# Patient Record
Sex: FEMALE | Race: BLACK OR AFRICAN AMERICAN | NOT HISPANIC OR LATINO | URBAN - METROPOLITAN AREA
[De-identification: names, ages, dates, MRNs, and addresses within clinical notes are randomized per-mention and may not be internally consistent; named-entity substitution may affect disease eponyms.]

---

## 2018-04-14 ENCOUNTER — INPATIENT (INPATIENT)
Facility: HOSPITAL | Age: 68
LOS: 1 days | Discharge: HOME | End: 2018-04-16
Attending: INTERNAL MEDICINE | Admitting: INTERNAL MEDICINE
Payer: MEDICARE

## 2018-04-14 VITALS
HEART RATE: 104 BPM | RESPIRATION RATE: 16 BRPM | TEMPERATURE: 98 F | DIASTOLIC BLOOD PRESSURE: 74 MMHG | SYSTOLIC BLOOD PRESSURE: 112 MMHG | OXYGEN SATURATION: 100 %

## 2018-04-14 LAB
ALBUMIN SERPL ELPH-MCNC: 4 G/DL — SIGNIFICANT CHANGE UP (ref 3.3–5)
ALP SERPL-CCNC: 83 U/L — SIGNIFICANT CHANGE UP (ref 40–120)
ALT FLD-CCNC: 19 U/L — SIGNIFICANT CHANGE UP (ref 4–33)
APTT BLD: 36.5 SEC — SIGNIFICANT CHANGE UP (ref 27.5–37.4)
AST SERPL-CCNC: 21 U/L — SIGNIFICANT CHANGE UP (ref 4–32)
BASOPHILS # BLD AUTO: 0.03 K/UL — SIGNIFICANT CHANGE UP (ref 0–0.2)
BASOPHILS NFR BLD AUTO: 0.4 % — SIGNIFICANT CHANGE UP (ref 0–2)
BASOPHILS NFR SPEC: 0 % — SIGNIFICANT CHANGE UP (ref 0–2)
BILIRUB SERPL-MCNC: 0.5 MG/DL — SIGNIFICANT CHANGE UP (ref 0.2–1.2)
BUN SERPL-MCNC: 23 MG/DL — SIGNIFICANT CHANGE UP (ref 7–23)
CALCIUM SERPL-MCNC: 9.5 MG/DL — SIGNIFICANT CHANGE UP (ref 8.4–10.5)
CHLORIDE SERPL-SCNC: 104 MMOL/L — SIGNIFICANT CHANGE UP (ref 98–107)
CK SERPL-CCNC: 99 U/L — SIGNIFICANT CHANGE UP (ref 25–170)
CO2 SERPL-SCNC: 26 MMOL/L — SIGNIFICANT CHANGE UP (ref 22–31)
CREAT SERPL-MCNC: 1.03 MG/DL — SIGNIFICANT CHANGE UP (ref 0.5–1.3)
DACRYOCYTES BLD QL SMEAR: SLIGHT — SIGNIFICANT CHANGE UP
EOSINOPHIL # BLD AUTO: 0.06 K/UL — SIGNIFICANT CHANGE UP (ref 0–0.5)
EOSINOPHIL NFR BLD AUTO: 0.9 % — SIGNIFICANT CHANGE UP (ref 0–6)
EOSINOPHIL NFR FLD: 2.7 % — SIGNIFICANT CHANGE UP (ref 0–6)
GIANT PLATELETS BLD QL SMEAR: PRESENT — SIGNIFICANT CHANGE UP
GLUCOSE SERPL-MCNC: 123 MG/DL — HIGH (ref 70–99)
HBA1C BLD-MCNC: 4.7 % — SIGNIFICANT CHANGE UP (ref 4–5.6)
HCT VFR BLD CALC: 35.8 % — SIGNIFICANT CHANGE UP (ref 34.5–45)
HGB BLD-MCNC: 12.1 G/DL — SIGNIFICANT CHANGE UP (ref 11.5–15.5)
HYPOCHROMIA BLD QL: SLIGHT — SIGNIFICANT CHANGE UP
IMM GRANULOCYTES # BLD AUTO: 0.02 # — SIGNIFICANT CHANGE UP
IMM GRANULOCYTES NFR BLD AUTO: 0.3 % — SIGNIFICANT CHANGE UP (ref 0–1.5)
INR BLD: 1.1 — SIGNIFICANT CHANGE UP (ref 0.88–1.17)
LYMPHOCYTES # BLD AUTO: 1.36 K/UL — SIGNIFICANT CHANGE UP (ref 1–3.3)
LYMPHOCYTES # BLD AUTO: 19.9 % — SIGNIFICANT CHANGE UP (ref 13–44)
LYMPHOCYTES NFR SPEC AUTO: 16.5 % — SIGNIFICANT CHANGE UP (ref 13–44)
MCHC RBC-ENTMCNC: 24.1 PG — LOW (ref 27–34)
MCHC RBC-ENTMCNC: 33.8 % — SIGNIFICANT CHANGE UP (ref 32–36)
MCV RBC AUTO: 71.2 FL — LOW (ref 80–100)
MICROCYTES BLD QL: SLIGHT — SIGNIFICANT CHANGE UP
MONOCYTES # BLD AUTO: 0.36 K/UL — SIGNIFICANT CHANGE UP (ref 0–0.9)
MONOCYTES NFR BLD AUTO: 5.3 % — SIGNIFICANT CHANGE UP (ref 2–14)
MONOCYTES NFR BLD: 3.7 % — SIGNIFICANT CHANGE UP (ref 2–9)
NEUTROPHIL AB SER-ACNC: 67 % — SIGNIFICANT CHANGE UP (ref 43–77)
NEUTROPHILS # BLD AUTO: 4.99 K/UL — SIGNIFICANT CHANGE UP (ref 1.8–7.4)
NEUTROPHILS NFR BLD AUTO: 73.2 % — SIGNIFICANT CHANGE UP (ref 43–77)
NRBC # FLD: 0 — SIGNIFICANT CHANGE UP
PLATELET # BLD AUTO: 334 K/UL — SIGNIFICANT CHANGE UP (ref 150–400)
PLATELET COUNT - ESTIMATE: NORMAL — SIGNIFICANT CHANGE UP
PMV BLD: 9.2 FL — SIGNIFICANT CHANGE UP (ref 7–13)
POLYCHROMASIA BLD QL SMEAR: SIGNIFICANT CHANGE UP
POTASSIUM SERPL-MCNC: 3.8 MMOL/L — SIGNIFICANT CHANGE UP (ref 3.5–5.3)
POTASSIUM SERPL-SCNC: 3.8 MMOL/L — SIGNIFICANT CHANGE UP (ref 3.5–5.3)
PROT SERPL-MCNC: 7.4 G/DL — SIGNIFICANT CHANGE UP (ref 6–8.3)
PROTHROM AB SERPL-ACNC: 12.2 SEC — SIGNIFICANT CHANGE UP (ref 9.8–13.1)
RBC # BLD: 5.03 M/UL — SIGNIFICANT CHANGE UP (ref 3.8–5.2)
RBC # FLD: 16.3 % — HIGH (ref 10.3–14.5)
REVIEW TO FOLLOW: YES — SIGNIFICANT CHANGE UP
SMUDGE CELLS # BLD: PRESENT — SIGNIFICANT CHANGE UP
SODIUM SERPL-SCNC: 143 MMOL/L — SIGNIFICANT CHANGE UP (ref 135–145)
TARGETS BLD QL SMEAR: SIGNIFICANT CHANGE UP
TROPONIN T SERPL-MCNC: < 0.06 NG/ML — SIGNIFICANT CHANGE UP (ref 0–0.06)
TROPONIN T SERPL-MCNC: < 0.06 NG/ML — SIGNIFICANT CHANGE UP (ref 0–0.06)
VARIANT LYMPHS # BLD: 10.1 % — SIGNIFICANT CHANGE UP
WBC # BLD: 6.82 K/UL — SIGNIFICANT CHANGE UP (ref 3.8–10.5)
WBC # FLD AUTO: 6.82 K/UL — SIGNIFICANT CHANGE UP (ref 3.8–10.5)

## 2018-04-14 PROCEDURE — 71046 X-RAY EXAM CHEST 2 VIEWS: CPT | Mod: 26

## 2018-04-14 RX ORDER — SODIUM CHLORIDE 9 MG/ML
1000 INJECTION INTRAMUSCULAR; INTRAVENOUS; SUBCUTANEOUS ONCE
Qty: 0 | Refills: 0 | Status: COMPLETED | OUTPATIENT
Start: 2018-04-14 | End: 2018-04-14

## 2018-04-14 RX ADMIN — SODIUM CHLORIDE 1000 MILLILITER(S): 9 INJECTION INTRAMUSCULAR; INTRAVENOUS; SUBCUTANEOUS at 16:48

## 2018-04-14 NOTE — ED CDU PROVIDER INITIAL DAY NOTE - ATTENDING CONTRIBUTION TO CARE
I Farhan Yu MD have personally performed a face to face diagnostic evaluation on this patient.  I have reviewed the ACP note and agree with the history, exam, and plan of care, except as noted.   My medical decision making and observations are found above.    Lungs clear, =strength, nl pronator (no drift.)

## 2018-04-14 NOTE — ED CDU PROVIDER INITIAL DAY NOTE - OBJECTIVE STATEMENT
Patient is a 67 year old female who states that this am she was sitting outside with her grand daughter when she became dizzy and lightheaded. States she was seated and that time and slumped over in the chair. Patient states she noticed her words starting to slur and become slower.  She states that it lasted approximately 5-10 minutes. EMS was called and they brought her to the ER.

## 2018-04-14 NOTE — ED CDU PROVIDER INITIAL DAY NOTE - MEDICAL DECISION MAKING DETAILS
patient is a 67 female with syncopal episode. PLan for cardiac enzymes, telemetry monitor, echocardiogram patient is a 67 female with syncopal episode. PLan for cardiac enzymes, telemetry monitor, echocardiogram  Gigi: episode of decreased responsiveness with slurred speech for less than a minute. then she was awake and nl. Never had sz activity, never had focal weakness. No complaints now.  Diabetic but not on hypoglycemics. will monitor rhythm in cdu, enzymes and ekg. Will get echo. Not felt to be stroke.

## 2018-04-14 NOTE — ED ADULT TRIAGE NOTE - CHIEF COMPLAINT QUOTE
Pt was slouched over in chair and family called 911.  Pt states she felt dizzy after sitting in sun.  Denies CP/SOB.  No LOC.  Daughter states pt slurred her speech during incident.  Denies ACE inhibitor allergy.  Speech clear, equal strength, no drift, no facial droop.  Dr. Yu notified to evaluate for stroke.  No code stroke called.  Incident occurred 1h 15 min ago.

## 2018-04-14 NOTE — ED PROVIDER NOTE - MEDICAL DECISION MAKING DETAILS
syncope; plan tele monitoring, ekg, labs, cxr, admit syncope; plan tele monitoring, ekg, labs, cxr, admit vs CDU for serial troponin/echo.

## 2018-04-14 NOTE — ED PROVIDER NOTE - PSH
S/P arthroscopy of left knee    S/P arthroscopy of right knee  x2  S/P biopsy  right breast  S/P colonoscopy  with polyp removal

## 2018-04-14 NOTE — ED PROVIDER NOTE - PMH
Diabetes mellitus  diet controlled  Diverticulosis    Gastritis    Hiatus hernia syndrome    HTN (hypertension)    Osteoarthritis  b/l knee  Restless leg

## 2018-04-14 NOTE — ED PROVIDER NOTE - ATTENDING CONTRIBUTION TO CARE
Attending Statement: I have personally seen and examined this patient. I have fully participated in the care of this patient. I have reviewed all pertinent clinical information, including history physical exam, plan and the Resident's note and agree except as noted  66yo F HX of HTN, DM from home sp syncopal episode. pt was outside, started to feel nausea and "not well" sat down, daughter found pt "slumped over" min responsive. Lasted for a few min. pt did not fall, no shaking activity. no urine or bowel incontinence. no vomit. pt wasn't responding to family. now feels better. Pt denies any cp/sob/palpitations. no focal weakness or numbness. no headache. no slurred speech. no change in vision. no prior cardiac workup. not on AC>  Vital signs noted. pt sitting up, nontoxic appearing. EOMI> no slurred speech. no fa Attending Statement: I have personally seen and examined this patient. I have fully participated in the care of this patient. I have reviewed all pertinent clinical information, including history physical exam, plan and the Resident's note and agree except as noted  68yo F HX of HTN, DM from home sp syncopal episode. pt was outside, started to feel nausea and "not well" sat down, daughter found pt "slumped over" min responsive. Lasted for a few min. pt did not fall, no shaking activity. no urine or bowel incontinence. no vomit. pt wasn't responding to family. now feels better. Pt denies any cp/sob/palpitations. no focal weakness or numbness. no headache. no slurred speech. no change in vision. no prior cardiac workup. not on AC>  Vital signs noted. pt sitting up, nontoxic appearing. EOMI> no slurred speech. no facial droop. no slurred speech. no drooling. nl sensation bl face/arms/Legs. normal S1-S2 No resp distress. able to speak in full and clear sentences. no wheeze, rales or stridor. AOx3, no focal deficits. CN 2-12 grossly intact. no meningeal signs. nl finger to nose.   plan ekg, labs, tele monitoring, admit for syncope.

## 2018-04-14 NOTE — ED PROVIDER NOTE - OBJECTIVE STATEMENT
67F h/o HTN and DM p/w near syncopal episode. Family found pt slumped over in a chair, eyes barely open, mumbling. Episode lasted a few minutes with rapid return to baseline mental status. Pt reports lightheadedness and nausea. No chest pain, SOB, diaphoresis, fall, LOC, or vomiting. No bowel/bladder incontinence or convulsions. No focal weakness/numbness. 67F h/o HTN and DM p/w near syncopal episode. Family found pt slumped over in a chair, eyes barely open, mumbling. Episode lasted a few minutes with rapid return to baseline mental status. Pt reports lightheadedness and nausea. No chest pain, SOB, diaphoresis, fall, LOC, or vomiting. No bowel/bladder incontinence or convulsions. No focal weakness/numbness. No recent illness.

## 2018-04-14 NOTE — ED CDU PROVIDER INITIAL DAY NOTE - ENMT NEGATIVE STATEMENT, MLM
Ears: no ear pain and no hearing problems. Nose: no nasal congestion and no nasal drainage .Mouth/Throat: no dysphagia, no hoarseness and no throat pain.Neck: no lumps, no pain, no stiffness and no swollen glands

## 2018-04-15 DIAGNOSIS — R55 SYNCOPE AND COLLAPSE: ICD-10-CM

## 2018-04-15 DIAGNOSIS — I10 ESSENTIAL (PRIMARY) HYPERTENSION: ICD-10-CM

## 2018-04-15 DIAGNOSIS — E11.9 TYPE 2 DIABETES MELLITUS WITHOUT COMPLICATIONS: ICD-10-CM

## 2018-04-15 DIAGNOSIS — Z29.9 ENCOUNTER FOR PROPHYLACTIC MEASURES, UNSPECIFIED: ICD-10-CM

## 2018-04-15 PROCEDURE — 99223 1ST HOSP IP/OBS HIGH 75: CPT

## 2018-04-15 PROCEDURE — 93306 TTE W/DOPPLER COMPLETE: CPT | Mod: 26

## 2018-04-15 RX ORDER — DEXTROSE 50 % IN WATER 50 %
1 SYRINGE (ML) INTRAVENOUS ONCE
Qty: 0 | Refills: 0 | Status: DISCONTINUED | OUTPATIENT
Start: 2018-04-15 | End: 2018-04-16

## 2018-04-15 RX ORDER — LOSARTAN POTASSIUM 100 MG/1
50 TABLET, FILM COATED ORAL DAILY
Qty: 0 | Refills: 0 | Status: DISCONTINUED | OUTPATIENT
Start: 2018-04-15 | End: 2018-04-16

## 2018-04-15 RX ORDER — LOSARTAN POTASSIUM 100 MG/1
0 TABLET, FILM COATED ORAL
Qty: 0 | Refills: 0 | COMMUNITY

## 2018-04-15 RX ORDER — LOSARTAN POTASSIUM 100 MG/1
1 TABLET, FILM COATED ORAL
Qty: 0 | Refills: 0 | COMMUNITY

## 2018-04-15 RX ORDER — HYDROCHLOROTHIAZIDE 25 MG
12.5 TABLET ORAL DAILY
Qty: 0 | Refills: 0 | Status: DISCONTINUED | OUTPATIENT
Start: 2018-04-15 | End: 2018-04-16

## 2018-04-15 RX ORDER — GLUCAGON INJECTION, SOLUTION 0.5 MG/.1ML
1 INJECTION, SOLUTION SUBCUTANEOUS ONCE
Qty: 0 | Refills: 0 | Status: DISCONTINUED | OUTPATIENT
Start: 2018-04-15 | End: 2018-04-16

## 2018-04-15 RX ORDER — LANOLIN ALCOHOL/MO/W.PET/CERES
3 CREAM (GRAM) TOPICAL AT BEDTIME
Qty: 0 | Refills: 0 | Status: DISCONTINUED | OUTPATIENT
Start: 2018-04-15 | End: 2018-04-16

## 2018-04-15 RX ORDER — ENOXAPARIN SODIUM 100 MG/ML
40 INJECTION SUBCUTANEOUS EVERY 24 HOURS
Qty: 0 | Refills: 0 | Status: DISCONTINUED | OUTPATIENT
Start: 2018-04-15 | End: 2018-04-16

## 2018-04-15 RX ORDER — DEXTROSE 50 % IN WATER 50 %
25 SYRINGE (ML) INTRAVENOUS ONCE
Qty: 0 | Refills: 0 | Status: DISCONTINUED | OUTPATIENT
Start: 2018-04-15 | End: 2018-04-16

## 2018-04-15 RX ORDER — INSULIN LISPRO 100/ML
VIAL (ML) SUBCUTANEOUS AT BEDTIME
Qty: 0 | Refills: 0 | Status: DISCONTINUED | OUTPATIENT
Start: 2018-04-15 | End: 2018-04-16

## 2018-04-15 RX ORDER — SODIUM CHLORIDE 9 MG/ML
1000 INJECTION, SOLUTION INTRAVENOUS
Qty: 0 | Refills: 0 | Status: DISCONTINUED | OUTPATIENT
Start: 2018-04-15 | End: 2018-04-16

## 2018-04-15 RX ORDER — INSULIN LISPRO 100/ML
VIAL (ML) SUBCUTANEOUS
Qty: 0 | Refills: 0 | Status: DISCONTINUED | OUTPATIENT
Start: 2018-04-15 | End: 2018-04-16

## 2018-04-15 RX ORDER — DEXTROSE 50 % IN WATER 50 %
12.5 SYRINGE (ML) INTRAVENOUS ONCE
Qty: 0 | Refills: 0 | Status: DISCONTINUED | OUTPATIENT
Start: 2018-04-15 | End: 2018-04-16

## 2018-04-15 RX ORDER — SODIUM CHLORIDE 9 MG/ML
1000 INJECTION INTRAMUSCULAR; INTRAVENOUS; SUBCUTANEOUS ONCE
Qty: 0 | Refills: 0 | Status: COMPLETED | OUTPATIENT
Start: 2018-04-15 | End: 2018-04-15

## 2018-04-15 RX ORDER — ACETAMINOPHEN 500 MG
650 TABLET ORAL EVERY 6 HOURS
Qty: 0 | Refills: 0 | Status: DISCONTINUED | OUTPATIENT
Start: 2018-04-15 | End: 2018-04-16

## 2018-04-15 RX ORDER — POLYETHYLENE GLYCOL 3350 17 G/17G
17 POWDER, FOR SOLUTION ORAL
Qty: 0 | Refills: 0 | Status: DISCONTINUED | OUTPATIENT
Start: 2018-04-15 | End: 2018-04-16

## 2018-04-15 RX ADMIN — LOSARTAN POTASSIUM 50 MILLIGRAM(S): 100 TABLET, FILM COATED ORAL at 22:47

## 2018-04-15 RX ADMIN — Medication 3 MILLIGRAM(S): at 22:48

## 2018-04-15 RX ADMIN — POLYETHYLENE GLYCOL 3350 17 GRAM(S): 17 POWDER, FOR SOLUTION ORAL at 22:47

## 2018-04-15 RX ADMIN — Medication 12.5 MILLIGRAM(S): at 22:48

## 2018-04-15 RX ADMIN — ENOXAPARIN SODIUM 40 MILLIGRAM(S): 100 INJECTION SUBCUTANEOUS at 22:47

## 2018-04-15 RX ADMIN — SODIUM CHLORIDE 500 MILLILITER(S): 9 INJECTION INTRAMUSCULAR; INTRAVENOUS; SUBCUTANEOUS at 20:30

## 2018-04-15 NOTE — ED CDU PROVIDER SUBSEQUENT DAY NOTE - HISTORY
Pt is a 68 y/o F p/w syncope yesterday.  Pt states yesterday, while sitting outside, she felt lightheaded then passed out.  Episode lasted for ~ 5 minutes.  Afterwards, pt as asymptomatic.  No associated chest pain, SOB.  Pt sent to CDU for tele monitoring and echo.  Presently pt offers no acute complaints.

## 2018-04-15 NOTE — H&P ADULT - HISTORY OF PRESENT ILLNESS
66 y/o Female with PMHx of HTN, DM, and asthma presents with syncope. Patient states she finished eating breakfast when she then started to experience dizziness and nausea. She went outside yesterday with family was able to go sit down on a chair and then passed out. She states her daughter said she was slumped over on the chair and not responding, unknown LOC. Within a few minutes she was back to baseline. Daughter called 911, in ambulance patient said she had elevated HR and low BP. She denies any prodromal chest pain, SOB, or palpitations. She has history of hypertension on Losartan/Hctz every other day- last taken on Friday evening. She has a history of hyperlipidemia, in February cholesterol levels normalized, simvastatin was discontinued per PCP.  Patient admitted to CDU where her vital signs remained stable and cardiac enzymes negative x 2. Echocardiogram performed showed  EF 67% Normal mitral valve. Chordal systolic anterior motion. Asymmetric septal hypertrophy. Normal left ventricular systolic function. Complete pulsed doppler of the LVOT not performed.  Turbulent flow of the LVOT  noted with color Doppler.  Unable to assess LVOT gradient. Suggest repeat limited doppler study from the LV cavity through the LVOT (with Valsalva) to assess for LVOT gradient.*** No previous echo exam. She was seen by House cardiology who recommended limited echocardiogram and telemetry monitoring. Patient denies any prior syncopal episodes. 68 y/o Female with PMHx of HTN, DM, and asthma presents with syncope. Patient states she finished eating breakfast when she then started to experience dizziness and nausea. She went outside yesterday with family was able to go sit down on a chair and then passed out. She states her daughter said she was slumped over on the chair and not responding. Within a few minutes she was back to baseline. Daughter called 911, in ambulance patient said she had elevated HR and low BP. She denies any prodromal chest pain, SOB, or palpitations. She has history of hypertension on Losartan/Hctz every other day- last taken on Friday evening. She has a history of hyperlipidemia, in February cholesterol levels normalized, simvastatin was discontinued per PCP.  Patient admitted to CDU where her vital signs remained stable and cardiac enzymes negative x 2. Echocardiogram performed showed  EF 67% Normal mitral valve. Chordal systolic anterior motion. Asymmetric septal hypertrophy. Normal left ventricular systolic function. Complete pulsed doppler of the LVOT not performed.  Turbulent flow of the LVOT  noted with color Doppler.  Unable to assess LVOT gradient. Suggest repeat limited doppler study from the LV cavity through the LVOT (with Valsalva) to assess for LVOT gradient.*** No previous echo exam. She was seen by House cardiology who recommended limited echocardiogram and telemetry monitoring. Patient denies any prior syncopal episodes. Currently asymptomatic, resting comfortably in bed.

## 2018-04-15 NOTE — ED CDU PROVIDER SUBSEQUENT DAY NOTE - PROGRESS NOTE DETAILS
Spoke with cardiology who will see pt in regards to abnormal echo. Spoke with cardiology who would like pt admitted to have repeat echo performed in the morning. Spoke with hospitalist Dr. Hunter who accepts this pt to her service. Pts PMD Dr.Mekala Crenshaw is not affiliated Spoke with cardiology who would like pt admitted to have repeat echo performed in the morning. Spoke with hospitalist Dr. Hunter who accepts this pt to her service. Pts PMD Dr.Mekala Crenshaw is not affiliated    CDU Att PN: Dax  Pt with recent syncope, now with abnrmal echo.  Is feeling well and has been asymptomatic, but will admit to cards for further eval.  I have personally performed a face to face evaluation of this patient including review of the history and focused physical exam.  I have discussed the case and reviewed the plan of care with the PA.

## 2018-04-15 NOTE — H&P ADULT - NSHPREVIEWOFSYSTEMS_GEN_ALL_CORE
Constitutional: No fever; no chills; fatigue or weight loss/gain; no diaphoresis  Skin: No rash, itching, ulcerations, dryness, pressure ulcer.  Eyes: no diplopia; no photophobia; no blurred vision   ENT: no hearing difficulty; no ear pain; no tinnitus; no vertigo; no nasal congestion; no nasal discharge; no nose bleeds no sore throat.  Endocrine: No thyroid problems.  Cardiovascular: No chest pain; no palpitations; no dyspnea on exertion; no PND; no orthopnea;   Respiratory: no shortness of breath; no wheezing; no dyspnea; no cough.  Gastrointestinal: No abdominal pain; +nausea; no vomiting; or diarrhea.  Genitourinary: No dysuria.  Musculoskeletal: no arthralgia; no arthritis; no joint swelling; no muscle weakness  Neurologic: no transient paralysis; no weakness; no paresthesias; no difficulty walking; no headache; +syncope

## 2018-04-15 NOTE — CONSULT NOTE ADULT - SUBJECTIVE AND OBJECTIVE BOX
Date of Admission: 4/15/2018    CHIEF COMPLAINT: syncope    HISTORY OF PRESENT ILLNESS: 67F with HTN, DM, and asthma presents with syncope. Patient was outside yesterday with family, had her breakfast and a while later felt dizzy, was able to go sit down and then passed out. Other than mild dizziness she denies any other prodrome, chest pain, SOB, or palpitations. Within a few minutes she felt better. In ER/CDU VSS and cardiac enzymes negative however TTE showed chordal NICOLE with nl MV however LVOT was difficult to evaluate.       Allergies    ACE inhibitors (Other)    Intolerances    	    MEDICATIONS:        melatonin 3 milliGRAM(s) Oral at bedtime        PAST MEDICAL & SURGICAL HISTORY:  HTN (hypertension)  Restless leg  Osteoarthritis: b/l knee  Hiatus hernia syndrome  Gastritis  Diverticulosis  Diabetes mellitus: diet controlled  S/P biopsy: right breast  S/P colonoscopy: with polyp removal  S/P arthroscopy of right knee: x2  S/P arthroscopy of left knee      FAMILY HISTORY:  No pertinent family history in first degree relatives      SOCIAL HISTORY:    Smoker  denies  Alcohol  denies  Drugs  denies      REVIEW OF SYSTEMS:  See HPI. Otherwise, 10 point ROS done and otherwise negative.    PHYSICAL EXAM:  T(C): 36.8 (04-15-18 @ 14:26), Max: 36.8 (04-14-18 @ 18:38)  HR: 73 (04-15-18 @ 14:26) (63 - 99)  BP: 137/76 (04-15-18 @ 14:26) (117/76 - 163/89)  RR: 16 (04-15-18 @ 14:26) (16 - 18)  SpO2: 99% (04-15-18 @ 14:26) (99% - 100%)  Wt(kg): --  I&O's Summary      Appearance: Normal	  HEENT:   Normal oral mucosa, PERRL, EOMI	  Cardiovascular: nl S1S2, no M/R/C, no change in heart sounds with valsalva manuever  Respiratory: CTA B/L  Psychiatry: A & O x 3, Mood & affect appropriate  Gastrointestinal:  Soft, Non-tender, + BS	  Skin: No rashes, No ecchymoses, No cyanosis	  Neurologic: Non-focal  Extremities: Normal range of motion, No clubbing, cyanosis or edema          LABS:	 	                        12.1   6.82  )-----------( 334      ( 14 Apr 2018 14:20 )             35.8       04-14    143  |  104  |  23  ----------------------------<  123<H>  3.8   |  26  |  1.03    Ca    9.5      14 Apr 2018 14:20    TPro  7.4  /  Alb  4.0  /  TBili  0.5  /  DBili  x   /  AST  21  /  ALT  19  /  AlkPhos  83  04-14        HgA1c: Hemoglobin A1C, Whole Blood: 4.7 % (04-14 @ 21:15)        CARDIAC MARKERS:  Troponin T, Serum: < 0.06 ng/mL (04-14-18 @ 21:15)  Troponin T, Serum: < 0.06 ng/mL (04-14-18 @ 14:20)        TELEMETRY: NSR  	    ECG:  	NSR with ? borderline LVH (avL)  PREVIOUS DIAGNOSTIC TESTING:    [ ] Echocardiogram:  Patient name: KAILEY MANJARREZ  YOB: 1950   Age: 67 (F)   MR#: [id number]  Study Date: 4/15/2018  Location: O/PSonographer: Valorie Vernon  Study quality: Technically Fair  Referring Physician: Not Available Doctor, MD  Blood Pressure: 186/93 mmHg  Height: 160 cm  Weight: 83 kg  BSA: 1.9 m2  ------------------------------------------------------------------------  PROCEDURE: Transthoracic echocardiogram with 2-D, M-Mode  and complete spectral and color flow Doppler.  INDICATION: Syncope and collapse (R55)  ------------------------------------------------------------------------  DIMENSIONS:  Dimensions:     Normal Values:  LA:     3.4 cm    2.0 - 4.0 cm  Ao:     3.3 cm    2.0 - 3.8 cm  SEPTUM: 1.7 cm    0.6 - 1.2 cm  PWT:    0.9 cm   0.6 - 1.1 cm  LVIDd:  3.8 cm    3.0 - 5.6 cm  LVIDs:  2.4 cm    1.8 - 4.0 cm  Derived Variables:  LVMI: 93 g/m2  RWT: 0.47  Fractional short: 37 %  Ejection Fraction (Teicholtz): 67 %  ------------------------------------------------------------------------  OBSERVATIONS:  Mitral Valve: Normal mitral valve.  Chordal systolic  anterior motion. Minimal mitral regurgitation.  Aortic Root: Normal aortic root.  Aortic Valve: Normal trileaflet aortic valve. Complete  pulsed doppler of the LVOT not performed.  Turbulent flow  of the LVOT  noted with color Doppler.  Unable to assess  LVOT gradient.  Left Atrium: Normal left atrium.  LA volume index = 25  cc/m2.  Left Ventricle: Normal left ventricular systolic function.  Asymmetric septal hypertrophy. (DT:338 ms).  Right Heart: Normal right atrium. Normal right ventricular  size and function. Normal tricuspid valve. Normal pulmonic  valve. Mild pulmonic regurgitation.  Pericardium/PleuraNormal pericardium with no pericardial  effusion.  ------------------------------------------------------------------------  CONCLUSIONS:  1. Normal mitral valve.  Chordal systolic anterior motion.  2. Asymmetric septal hypertrophy.  3. Normal left ventricular systolic function. Complete  pulsed doppler of the LVOT not performed.  Turbulent flow  of the LVOT  noted with color Doppler.  Unable to assess  LVOT gradient.  Suggest repeat limited doppler study from the LV cavity  through the LVOT (with Valsalva) to assess for LVOT  gradient.  *** No previousEcho exam.  ------------------------------------------------------------------------  Confirmed on  4/15/2018 - 13:56:03 by Yordy Alvarado M.D.  ------------------------------------------------------------------------

## 2018-04-15 NOTE — H&P ADULT - PROBLEM SELECTOR PLAN 1
Monitor on telemetry for continuos cardiac monitoring  Serial EKGs and trend cardiac enzymes  Fall and seizure precautions  Orthostatics ordered  House cardiology consult appreciated chordal NICOLE noted on echo, possible IHSS.  Limited Echocardiogram ordered- to assess for LVOT gradient

## 2018-04-15 NOTE — H&P ADULT - NSHPPHYSICALEXAM_GEN_ALL_CORE
Appearance: Normal, NAD, NRD.  HEENT:   Normal oral mucosa, PERRL, EOMI	  Cardiovascular: Normal S1 S2, No JVD, No murmurs appreciated, No edema  Respiratory: Lungs clear to auscultation, no rales/rhonchi/wheezing	  Psychiatry: A & O x 3, Mood & affect appropriate  Gastrointestinal:  Soft, Non-tender, ND + BS	  Skin: No rashes, No ecchymoses, No cyanosis  Neurologic: Non-focal, sensation intact,  strength 5/5 B/L  Extremities: Normal range of motion, No clubbing, cyanosis or edema  Vascular: Peripheral pulses palpable

## 2018-04-15 NOTE — CONSULT NOTE ADULT - ASSESSMENT
67F with HTN, asthma, and DM presents with syncope and now with chordal NICOLE noted on echo, possible IHSS.      D/W Nicci Vines and Camila, cardio fellow and attending. Would admit to telemetry, repeat limited ECHO as recommended in report, and maintain adequate hydration. Cardio service will follow.

## 2018-04-15 NOTE — H&P ADULT - NSHPLABSRESULTS_GEN_ALL_CORE
4/15 TTE EF 67%  Normal mitral valve.  Chordal systolic anterior motion. Asymmetric septal hypertrophy. Normal left ventricular systolic function. Complete pulsed doppler of the LVOT not performed.  Turbulent flow of the LVOT  noted with color Doppler.  Unable to assess  LVOT gradient. Suggest repeat limited doppler study from the LV cavity through the LVOT (with Valsalva) to assess for LVOT gradient.*** No previous Echo exam.  EKG: NSR 70bpm LVH  CE x 2 neg  4/15 CXR: Clear Lungs 4/15 TTE EF 67%  Normal mitral valve.  Chordal systolic anterior motion. Asymmetric septal hypertrophy. Normal left ventricular systolic function. Complete pulsed doppler of the LVOT not performed.  Turbulent flow of the LVOT  noted with color Doppler.  Unable to assess  LVOT gradient. Suggest repeat limited doppler study from the LV cavity through the LVOT (with Valsalva) to assess for LVOT gradient.*** No previous Echo exam.  EKG reviewed personally: NSR 70bpm LVH  CE x 2 neg  4/15 CXR reviewed personally: Clear Lungs

## 2018-04-15 NOTE — H&P ADULT - PROBLEM SELECTOR PLAN 2
Continue antihypertensives  Monitor BP serially- Strict BP control due to LVOT  Sodium restriction. Continue antihypertensives  Monitor BP serially- Strict BP control due to LVOT  Sodium restriction.  Admits to allergy to ACE inhibitors in past- unknown reaction; patient takes losartan without allergic reaction.

## 2018-04-15 NOTE — ED CDU PROVIDER SUBSEQUENT DAY NOTE - MEDICAL DECISION MAKING DETAILS
Pt is a 68 y/o F p/w syncope yesterday. -- syncope -- echo, tele monitoring Pt is a 66 y/o F p/w syncope yesterday. -- syncope -- echo, tele monitoring. Well appearing now and asymptomatic, no focal findings on exam.

## 2018-04-15 NOTE — CONSULT NOTE ADULT - ATTENDING COMMENTS
I reviewed the original and f/u 2 D echos. There is no evidence of outflow tract obstruction or either study. The noted flow acceleration (turbulence) is likely from focal septal hypertrophy. No further cardiac w/u.

## 2018-04-15 NOTE — ED CDU PROVIDER SUBSEQUENT DAY NOTE - ATTENDING CONTRIBUTION TO CARE
ED Attending (Dr Verduzco): I have personally performed a face to face bedside history and physical examination of this patient.  I have discussed the case with the PA and  I have personally authored the following components: HPI, ROS, Physical Exam and MDM in addition to reviewing and revising the rest of the Provider Note. Pt is a 68 y/o F p/w syncope yesterday. -- syncope -- echo, tele monitoring. Well appearing now and asymptomatic, no focal findings on exam.

## 2018-04-15 NOTE — ED CDU PROVIDER DISPOSITION NOTE - CLINICAL COURSE
CDU Att Admit Note: Dax  Pt presented to ED after apparent brief LOC.  On echo while in CDU findings concerning for HOCM and requiring further eval.  Pt has been feeling well in CDU.  Will admit to cards for further eval.   I have personally performed a face to face evaluation of this patient including review of the history and focused physical exam.  I have discussed the case and reviewed the plan of care with the PA.

## 2018-04-15 NOTE — H&P ADULT - NSHPSOCIALHISTORY_GEN_ALL_CORE
Social History:  · Marital Status	    Substance Use History:  · Substance Use	denies use    Alcohol Use History:  · Have you ever consumed alcohol	yes...denies current alcohol use    Tobacco Usage:  · Tobacco Usage: Never smoker

## 2018-04-15 NOTE — PATIENT PROFILE ADULT. - NS TRANSFER PATIENT BELONGINGS
Clothing/Cell Phone/PDA (specify)/Electronic Device (specify)/Jewelry/Money (specify)/Other belongings/laptop. earbuds, 2 chargers, coat, sneakers, 2 rings, mouse, wallet, no cash, no cards,

## 2018-04-15 NOTE — H&P ADULT - PROBLEM SELECTOR PLAN 3
Patient not on insulin or hyperglycemic medications  Hemoglobin A1C in AM  Fingersticks per routine and at bedtime  ISS ordered  Hypoglycemia protocol ordered Patient not on insulin or hyperglycemic medications- states DM is controlled  Hemoglobin A1C in AM  Fingersticks per routine and at bedtime  ISS ordered  Hypoglycemia protocol ordered

## 2018-04-16 VITALS
SYSTOLIC BLOOD PRESSURE: 138 MMHG | OXYGEN SATURATION: 98 % | RESPIRATION RATE: 16 BRPM | DIASTOLIC BLOOD PRESSURE: 93 MMHG | TEMPERATURE: 98 F | HEART RATE: 88 BPM

## 2018-04-16 LAB
BASOPHILS # BLD AUTO: 0.02 K/UL — SIGNIFICANT CHANGE UP (ref 0–0.2)
BASOPHILS NFR BLD AUTO: 0.4 % — SIGNIFICANT CHANGE UP (ref 0–2)
BUN SERPL-MCNC: 14 MG/DL — SIGNIFICANT CHANGE UP (ref 7–23)
CALCIUM SERPL-MCNC: 9 MG/DL — SIGNIFICANT CHANGE UP (ref 8.4–10.5)
CHLORIDE SERPL-SCNC: 106 MMOL/L — SIGNIFICANT CHANGE UP (ref 98–107)
CHOLEST SERPL-MCNC: 192 MG/DL — SIGNIFICANT CHANGE UP (ref 120–199)
CO2 SERPL-SCNC: 24 MMOL/L — SIGNIFICANT CHANGE UP (ref 22–31)
CREAT SERPL-MCNC: 0.9 MG/DL — SIGNIFICANT CHANGE UP (ref 0.5–1.3)
EOSINOPHIL # BLD AUTO: 0.11 K/UL — SIGNIFICANT CHANGE UP (ref 0–0.5)
EOSINOPHIL NFR BLD AUTO: 2.3 % — SIGNIFICANT CHANGE UP (ref 0–6)
GLUCOSE SERPL-MCNC: 93 MG/DL — SIGNIFICANT CHANGE UP (ref 70–99)
HBA1C BLD-MCNC: 4.7 % — SIGNIFICANT CHANGE UP (ref 4–5.6)
HCT VFR BLD CALC: 33.7 % — LOW (ref 34.5–45)
HDLC SERPL-MCNC: 64 MG/DL — SIGNIFICANT CHANGE UP (ref 45–65)
HGB BLD-MCNC: 11.2 G/DL — LOW (ref 11.5–15.5)
IMM GRANULOCYTES # BLD AUTO: 0 # — SIGNIFICANT CHANGE UP
IMM GRANULOCYTES NFR BLD AUTO: 0 % — SIGNIFICANT CHANGE UP (ref 0–1.5)
LIPID PNL WITH DIRECT LDL SERPL: 121 MG/DL — SIGNIFICANT CHANGE UP
LYMPHOCYTES # BLD AUTO: 2.1 K/UL — SIGNIFICANT CHANGE UP (ref 1–3.3)
LYMPHOCYTES # BLD AUTO: 44.5 % — HIGH (ref 13–44)
MAGNESIUM SERPL-MCNC: 1.9 MG/DL — SIGNIFICANT CHANGE UP (ref 1.6–2.6)
MCHC RBC-ENTMCNC: 24.1 PG — LOW (ref 27–34)
MCHC RBC-ENTMCNC: 33.2 % — SIGNIFICANT CHANGE UP (ref 32–36)
MCV RBC AUTO: 72.6 FL — LOW (ref 80–100)
MONOCYTES # BLD AUTO: 0.41 K/UL — SIGNIFICANT CHANGE UP (ref 0–0.9)
MONOCYTES NFR BLD AUTO: 8.7 % — SIGNIFICANT CHANGE UP (ref 2–14)
NEUTROPHILS # BLD AUTO: 2.08 K/UL — SIGNIFICANT CHANGE UP (ref 1.8–7.4)
NEUTROPHILS NFR BLD AUTO: 44.1 % — SIGNIFICANT CHANGE UP (ref 43–77)
NRBC # FLD: 0 — SIGNIFICANT CHANGE UP
PHOSPHATE SERPL-MCNC: 3.1 MG/DL — SIGNIFICANT CHANGE UP (ref 2.5–4.5)
PLATELET # BLD AUTO: 289 K/UL — SIGNIFICANT CHANGE UP (ref 150–400)
PMV BLD: 9.4 FL — SIGNIFICANT CHANGE UP (ref 7–13)
POTASSIUM SERPL-MCNC: 3.5 MMOL/L — SIGNIFICANT CHANGE UP (ref 3.5–5.3)
POTASSIUM SERPL-SCNC: 3.5 MMOL/L — SIGNIFICANT CHANGE UP (ref 3.5–5.3)
RBC # BLD: 4.64 M/UL — SIGNIFICANT CHANGE UP (ref 3.8–5.2)
RBC # FLD: 16 % — HIGH (ref 10.3–14.5)
SODIUM SERPL-SCNC: 141 MMOL/L — SIGNIFICANT CHANGE UP (ref 135–145)
TRIGL SERPL-MCNC: 71 MG/DL — SIGNIFICANT CHANGE UP (ref 10–149)
TSH SERPL-MCNC: 2.86 UIU/ML — SIGNIFICANT CHANGE UP (ref 0.27–4.2)
WBC # BLD: 4.72 K/UL — SIGNIFICANT CHANGE UP (ref 3.8–10.5)
WBC # FLD AUTO: 4.72 K/UL — SIGNIFICANT CHANGE UP (ref 3.8–10.5)

## 2018-04-16 PROCEDURE — 93306 TTE W/DOPPLER COMPLETE: CPT | Mod: 26

## 2018-04-16 PROCEDURE — 99233 SBSQ HOSP IP/OBS HIGH 50: CPT

## 2018-04-16 RX ORDER — ACETAMINOPHEN 500 MG
2 TABLET ORAL
Qty: 0 | Refills: 0 | COMMUNITY
Start: 2018-04-16

## 2018-04-16 RX ORDER — LOSARTAN/HYDROCHLOROTHIAZIDE 100MG-25MG
1 TABLET ORAL
Qty: 30 | Refills: 0 | OUTPATIENT
Start: 2018-04-16 | End: 2018-05-15

## 2018-04-16 RX ORDER — LOSARTAN/HYDROCHLOROTHIAZIDE 100MG-25MG
1 TABLET ORAL
Qty: 0 | Refills: 0 | COMMUNITY

## 2018-04-16 RX ORDER — POLYETHYLENE GLYCOL 3350 17 G/17G
17 POWDER, FOR SOLUTION ORAL
Qty: 0 | Refills: 0 | COMMUNITY
Start: 2018-04-16

## 2018-04-16 RX ORDER — LANOLIN ALCOHOL/MO/W.PET/CERES
1 CREAM (GRAM) TOPICAL
Qty: 0 | Refills: 0 | COMMUNITY
Start: 2018-04-16

## 2018-04-16 RX ADMIN — Medication 12.5 MILLIGRAM(S): at 06:15

## 2018-04-16 RX ADMIN — LOSARTAN POTASSIUM 50 MILLIGRAM(S): 100 TABLET, FILM COATED ORAL at 06:15

## 2018-04-16 NOTE — PROGRESS NOTE ADULT - SUBJECTIVE AND OBJECTIVE BOX
Patient is a 67y old  Female who presents with a chief complaint of syncope (15 Apr 2018 20:27)      SUBJECTIVE / OVERNIGHT EVENTS: Pt in NAD no N/V/D/CP/positional vertigo    MEDICATIONS  (STANDING):  dextrose 5%. 1000 milliLiter(s) (50 mL/Hr) IV Continuous <Continuous>  dextrose 50% Injectable 12.5 Gram(s) IV Push once  dextrose 50% Injectable 25 Gram(s) IV Push once  dextrose 50% Injectable 25 Gram(s) IV Push once  enoxaparin Injectable 40 milliGRAM(s) SubCutaneous every 24 hours  hydrochlorothiazide 12.5 milliGRAM(s) Oral daily  insulin lispro (HumaLOG) corrective regimen sliding scale   SubCutaneous three times a day before meals  insulin lispro (HumaLOG) corrective regimen sliding scale   SubCutaneous at bedtime  losartan 50 milliGRAM(s) Oral daily  melatonin 3 milliGRAM(s) Oral at bedtime    MEDICATIONS  (PRN):  acetaminophen   Tablet. 650 milliGRAM(s) Oral every 6 hours PRN Mild and Moderate and Severe Pain (1-10)  dextrose Gel 1 Dose(s) Oral once PRN Blood Glucose LESS THAN 70 milliGRAM(s)/deciliter  glucagon  Injectable 1 milliGRAM(s) IntraMuscular once PRN Glucose LESS THAN 70 milligrams/deciliter  polyethylene glycol 3350 17 Gram(s) Oral two times a day PRN Constipation        CAPILLARY BLOOD GLUCOSE      POCT Blood Glucose.: 95 mg/dL (16 Apr 2018 12:56)  POCT Blood Glucose.: 97 mg/dL (16 Apr 2018 06:59)    I&O's Summary      T(C): 36.8 (04-16-18 @ 10:00), Max: 36.8 (04-15-18 @ 14:26)  HR: 81 (04-16-18 @ 10:00) (60 - 81)  BP: 138/69 (04-16-18 @ 10:00) (137/76 - 167/103)  RR: 16 (04-16-18 @ 10:00) (16 - 18)  SpO2: 100% (04-16-18 @ 10:00) (99% - 100%)    PHYSICAL EXAM:  GENERAL: NAD, well-developed  HEAD:  Atraumatic, Normocephalic  EYES: EOMI, PERRLA, conjunctiva and sclera clear  NECK: Supple, No JVD  CHEST/LUNG: Clear to auscultation bilaterally; No wheeze  HEART: Regular rate and rhythm; No murmurs, rubs, or gallops  ABDOMEN: Soft, Nontender, Nondistended; Bowel sounds present  EXTREMITIES:  2+ Peripheral Pulses, No clubbing, cyanosis, or edema  PSYCH: AAOx3  NEUROLOGY: non-focal  SKIN: No rashes or lesions    LABS:                        11.2   4.72  )-----------( 289      ( 16 Apr 2018 06:55 )             33.7     04-16    141  |  106  |  14  ----------------------------<  93  3.5   |  24  |  0.90    Ca    9.0      16 Apr 2018 06:55  Phos  3.1     04-16  Mg     1.9     04-16    TPro  7.4  /  Alb  4.0  /  TBili  0.5  /  DBili  x   /  AST  21  /  ALT  19  /  AlkPhos  83  04-14    PT/INR - ( 14 Apr 2018 14:20 )   PT: 12.2 SEC;   INR: 1.10          PTT - ( 14 Apr 2018 14:20 )  PTT:36.5 SEC  CARDIAC MARKERS ( 14 Apr 2018 21:15 )  x     / < 0.06 ng/mL / 99 u/L / x     / x      CARDIAC MARKERS ( 14 Apr 2018 14:20 )  x     / < 0.06 ng/mL / x     / x     / x                RADIOLOGY & ADDITIONAL TESTS:    Imaging Personally Reviewed:    Consultant(s) Notes Reviewed:      Care Discussed with Consultants/Other Providers:

## 2018-04-16 NOTE — PROGRESS NOTE ADULT - PROBLEM SELECTOR PLAN 1
-neg orthostatics  -House cardiology consult appreciated chordal NICOLE noted on echo, possible IHSS.  Limited Echocardiogram ordered- to assess for LVOT gradient  cards f/u appreciated

## 2018-04-16 NOTE — DISCHARGE NOTE ADULT - HOSPITAL COURSE
66 y/o Female with PMHx of HTN, DM, and asthma presents with syncope.   MI ruled out by serial enzymes, no events on tele.  pt went for ECHO which showed........................... 68 y/o Female with PMHx of HTN, DM, and asthma presents with syncope. +Syncope- TTE showed chordal systolic anterior motion; LVOT; limited echocardiogram ordered; House cardiology following    MI ruled out by serial enzymes, no events on tele.  pt went for ECHO which showed 4/15 TTE EF 67%  Normal mitral valve.  Chordal systolic anterior motion. Asymmetric septal hypertrophy. Normal left ventricular systolic function. Complete pulsed doppler of the LVOT not performed.  Turbulent flow of the LVOT  noted with color Doppler.  Unable to assess  LVOT gradient.Suggest repeat limited doppler study from the LV cavity through the LVOT (with Valsalva) to assess for LVOT gradient.*** No previousEcho exam. 68 y/o Female with PMHx of HTN, DM, and asthma presents with syncope. +Syncope- TTE showed chordal systolic anterior motion; LVOT; limited echocardiogram ordered; House cardiology following    MI ruled out by serial enzymes, no events on tele.  pt went for ECHO which showed 4/15 TTE EF 67%  Normal mitral valve.  Chordal systolic anterior motion. Asymmetric septal hypertrophy. Normal left ventricular systolic function. Complete pulsed doppler of the LVOT not performed.  Turbulent flow of the LVOT  noted with color Doppler.  Unable to assess  LVOT gradient.Suggest repeat limited doppler study from the LV cavity through the LVOT (with Valsalva) to assess for LVOT gradient.*** No previousEcho exam.  Repeat TTE showed no LVOT  Cleared for discharge home

## 2018-04-16 NOTE — DISCHARGE NOTE ADULT - PATIENT PORTAL LINK FT
You can access the [facility], offered by [facility], by registering with the following website: [email or web address]

## 2018-04-16 NOTE — DISCHARGE NOTE ADULT - CARE PLAN
Principal Discharge DX:	Syncope  Goal:	drink fluids, stay hydrated  Assessment and plan of treatment:	follow up with MD within 1 week  Secondary Diagnosis:	HTN (hypertension)  Goal:	low salt diet  Assessment and plan of treatment:	cont medications  follow up with PMD within 1 week  Secondary Diagnosis:	Diabetes mellitus  Goal:	no concentrated sweets  Assessment and plan of treatment:	follow up with MD within 1  week Principal Discharge DX:	Syncope  Goal:	drink fluids, stay hydrated  Assessment and plan of treatment:	follow up with MD within 1 week  Secondary Diagnosis:	HTN (hypertension)  Goal:	low salt diet  Assessment and plan of treatment:	Monitor your blood pressure, your dose was cut in half during your admission. If your blood pressure remains elevated you may increase your dose back to your previous dose.  Secondary Diagnosis:	Diabetes mellitus  Goal:	no concentrated sweets  Assessment and plan of treatment:	follow up with MD within 1  week

## 2018-04-16 NOTE — DISCHARGE NOTE ADULT - PLAN OF CARE
drink fluids, stay hydrated follow up with MD within 1 week low salt diet cont medications  follow up with PMD within 1 week no concentrated sweets follow up with MD within 1  week Monitor your blood pressure, your dose was cut in half during your admission. If your blood pressure remains elevated you may increase your dose back to your previous dose.

## 2018-04-16 NOTE — DISCHARGE NOTE ADULT - MEDICATION SUMMARY - MEDICATIONS TO CHANGE
I will SWITCH the dose or number of times a day I take the medications listed below when I get home from the hospital:  None I will SWITCH the dose or number of times a day I take the medications listed below when I get home from the hospital:    losartan-hydrochlorothiazide 100mg-12.5mg oral tablet  -- 1 tab(s) by mouth once a day

## 2018-04-16 NOTE — DISCHARGE NOTE ADULT - CARE PROVIDER_API CALL
Ajith Rodríguez (MD), Internal Medicine  [id number] [address] [place] [address]  Phone: [phone]  Fax: [phone] Ajith Rodríguez), Internal Medicine  [address] [place] [address]  Phone: [phone]  Fax: [phone]    Pito Bella), Cardiovascular Disease; Nuclear Cardiology  [address] [place] [address]  Phone: [phone]  Fax: [phone]

## 2018-04-16 NOTE — DISCHARGE NOTE ADULT - MEDICATION SUMMARY - MEDICATIONS TO TAKE
I will START or STAY ON the medications listed below when I get home from the hospital:    acetaminophen 325 mg oral tablet  -- 2 tab(s) by mouth every 6 hours, As needed, Mild and Moderate and Severe Pain (1-10)  -- Indication: For pain    losartan-hydrochlorothiazide 100mg-12.5mg oral tablet  -- 1 tab(s) by mouth once a day  -- Indication: For HTN (hypertension)    polyethylene glycol 3350 oral powder for reconstitution  -- 17 gram(s) by mouth 2 times a day, As needed, Constipation  -- Indication: For constipation    melatonin 3 mg oral tablet  -- 1 tab(s) by mouth once a day (at bedtime)  -- Indication: For to help fall asleep    multivitamin  -- 1 tab(s) by mouth once a day  -- Indication: For Supplement I will START or STAY ON the medications listed below when I get home from the hospital:    acetaminophen 325 mg oral tablet  -- 2 tab(s) by mouth every 6 hours, As needed, Mild and Moderate and Severe Pain (1-10)  -- Indication: For pain    losartan-hydrochlorothiazide 50mg-12.5mg oral tablet  -- 1 tab(s) by mouth once a day  -- Indication: For HTN (hypertension)    polyethylene glycol 3350 oral powder for reconstitution  -- 17 gram(s) by mouth 2 times a day, As needed, Constipation  -- Indication: For constipation    multivitamin  -- 1 tab(s) by mouth once a day  -- Indication: For Supplement

## 2018-04-16 NOTE — DISCHARGE NOTE ADULT - CARE PROVIDERS DIRECT ADDRESSES
,DirectAddress_Unknown ,DirectAddress_Unknown,[email or web address]

## 2023-12-14 ENCOUNTER — OFFICE (OUTPATIENT)
Dept: URBAN - METROPOLITAN AREA CLINIC 77 | Facility: CLINIC | Age: 73
Setting detail: OPHTHALMOLOGY
End: 2023-12-14
Payer: COMMERCIAL

## 2023-12-14 DIAGNOSIS — E11.9: ICD-10-CM

## 2023-12-14 DIAGNOSIS — H43.822: ICD-10-CM

## 2023-12-14 DIAGNOSIS — H43.821: ICD-10-CM

## 2023-12-14 DIAGNOSIS — H43.812: ICD-10-CM

## 2023-12-14 PROCEDURE — 92235 FLUORESCEIN ANGRPH MLTIFRAME: CPT | Performed by: OPHTHALMOLOGY

## 2023-12-14 PROCEDURE — 92250 FUNDUS PHOTOGRAPHY W/I&R: CPT | Performed by: OPHTHALMOLOGY

## 2023-12-14 PROCEDURE — 92004 COMPRE OPH EXAM NEW PT 1/>: CPT | Performed by: OPHTHALMOLOGY

## 2023-12-14 ASSESSMENT — REFRACTION_MANIFEST
OS_ADD: +3.00
OD_SPHERE: -0.25
OS_AXIS: 75
OD_AXIS: 85
OD_CYLINDER: -0.75
OS_CYLINDER: -1.25
OD_VA1: 20/30
OD_ADD: +3.00
OS_SPHERE: +1.00
OS_VA1: 20/70-

## 2023-12-14 ASSESSMENT — SPHEQUIV_DERIVED
OD_SPHEQUIV: -0.625
OS_SPHEQUIV: 0.375
OS_SPHEQUIV: 0.75

## 2023-12-14 ASSESSMENT — REFRACTION_CURRENTRX
OS_CYLINDER: -1.00
OD_ADD: +3.00
OD_SPHERE: -0.50
OD_CYLINDER: -0.75
OS_ADD: +3.00
OS_OVR_VA: 20/
OD_AXIS: 90
OS_SPHERE: +0.50
OS_AXIS: 75
OD_OVR_VA: 20/

## 2023-12-14 ASSESSMENT — REFRACTION_AUTOREFRACTION
OS_AXIS: 076
OD_SPHERE: PLANO
OS_SPHERE: +1.50
OD_AXIS: 081
OS_CYLINDER: -1.50
OD_CYLINDER: -1.00

## 2023-12-14 ASSESSMENT — CONFRONTATIONAL VISUAL FIELD TEST (CVF)
OD_FINDINGS: FULL
OS_FINDINGS: FULL

## 2024-10-30 PROBLEM — Z00.00 ENCOUNTER FOR PREVENTIVE HEALTH EXAMINATION: Status: ACTIVE | Noted: 2024-10-30

## 2024-10-31 ENCOUNTER — APPOINTMENT (OUTPATIENT)
Dept: ORTHOPEDIC SURGERY | Facility: CLINIC | Age: 74
End: 2024-10-31
Payer: MEDICARE

## 2024-10-31 VITALS — BODY MASS INDEX: 34.04 KG/M2 | WEIGHT: 185 LBS | HEIGHT: 62 IN

## 2024-10-31 DIAGNOSIS — E11.9 TYPE 2 DIABETES MELLITUS W/OUT COMPLICATIONS: ICD-10-CM

## 2024-10-31 DIAGNOSIS — M16.11 UNILATERAL PRIMARY OSTEOARTHRITIS, RIGHT HIP: ICD-10-CM

## 2024-10-31 PROCEDURE — 99204 OFFICE O/P NEW MOD 45 MIN: CPT

## 2024-10-31 PROCEDURE — 73503 X-RAY EXAM HIP UNI 4/> VIEWS: CPT | Mod: RT

## 2024-10-31 RX ORDER — MELOXICAM 15 MG/1
15 TABLET ORAL
Qty: 30 | Refills: 2 | Status: ACTIVE | COMMUNITY
Start: 2024-10-31 | End: 1900-01-01

## 2024-10-31 RX ORDER — METHYLPREDNISOLONE 4 MG/1
4 TABLET ORAL
Qty: 1 | Refills: 0 | Status: ACTIVE | COMMUNITY
Start: 2024-10-31 | End: 1900-01-01

## 2024-10-31 RX ORDER — LOSARTAN POTASSIUM 100 MG/1
TABLET, FILM COATED ORAL
Refills: 0 | Status: ACTIVE | COMMUNITY

## 2024-10-31 RX ORDER — METHYLPREDNISOLONE 4 MG/1
4 TABLET ORAL
Qty: 1 | Refills: 1 | Status: ACTIVE | COMMUNITY
Start: 2024-10-31 | End: 1900-01-01

## 2024-12-12 ENCOUNTER — APPOINTMENT (OUTPATIENT)
Dept: ORTHOPEDIC SURGERY | Facility: CLINIC | Age: 74
End: 2024-12-12
Payer: MEDICARE

## 2024-12-12 DIAGNOSIS — M16.11 UNILATERAL PRIMARY OSTEOARTHRITIS, RIGHT HIP: ICD-10-CM

## 2024-12-12 PROCEDURE — 99213 OFFICE O/P EST LOW 20 MIN: CPT
